# Patient Record
Sex: MALE | Race: WHITE | NOT HISPANIC OR LATINO | Employment: FULL TIME | URBAN - METROPOLITAN AREA
[De-identification: names, ages, dates, MRNs, and addresses within clinical notes are randomized per-mention and may not be internally consistent; named-entity substitution may affect disease eponyms.]

---

## 2019-05-07 ENCOUNTER — OFFICE VISIT - HEALTHEAST (OUTPATIENT)
Dept: FAMILY MEDICINE | Facility: CLINIC | Age: 29
End: 2019-05-07

## 2019-05-07 ENCOUNTER — COMMUNICATION - HEALTHEAST (OUTPATIENT)
Dept: TELEHEALTH | Facility: CLINIC | Age: 29
End: 2019-05-07

## 2019-05-07 DIAGNOSIS — R03.0 ELEVATED BLOOD PRESSURE READING WITHOUT DIAGNOSIS OF HYPERTENSION: ICD-10-CM

## 2019-05-07 DIAGNOSIS — F32.A DEPRESSION, UNSPECIFIED DEPRESSION TYPE: ICD-10-CM

## 2019-05-07 DIAGNOSIS — Z91.09 ENVIRONMENTAL ALLERGIES: ICD-10-CM

## 2019-05-07 DIAGNOSIS — R10.11 RUQ ABDOMINAL PAIN: ICD-10-CM

## 2019-05-07 DIAGNOSIS — R19.4 BOWEL HABIT CHANGES: ICD-10-CM

## 2019-05-07 LAB
ALBUMIN SERPL-MCNC: 4.9 G/DL (ref 3.5–5)
ALP SERPL-CCNC: 55 U/L (ref 45–120)
ALT SERPL W P-5'-P-CCNC: 24 U/L (ref 0–45)
AMYLASE SERPL-CCNC: 57 U/L (ref 5–120)
ANION GAP SERPL CALCULATED.3IONS-SCNC: 13 MMOL/L (ref 5–18)
AST SERPL W P-5'-P-CCNC: 20 U/L (ref 0–40)
BASOPHILS # BLD AUTO: 0 THOU/UL (ref 0–0.2)
BASOPHILS NFR BLD AUTO: 1 % (ref 0–2)
BILIRUB SERPL-MCNC: 0.7 MG/DL (ref 0–1)
BUN SERPL-MCNC: 11 MG/DL (ref 8–22)
CALCIUM SERPL-MCNC: 10.7 MG/DL (ref 8.5–10.5)
CHLORIDE BLD-SCNC: 103 MMOL/L (ref 98–107)
CO2 SERPL-SCNC: 25 MMOL/L (ref 22–31)
CREAT SERPL-MCNC: 0.91 MG/DL (ref 0.7–1.3)
EOSINOPHIL # BLD AUTO: 0.1 THOU/UL (ref 0–0.4)
EOSINOPHIL NFR BLD AUTO: 3 % (ref 0–6)
ERYTHROCYTE [DISTWIDTH] IN BLOOD BY AUTOMATED COUNT: 10.9 % (ref 11–14.5)
GFR SERPL CREATININE-BSD FRML MDRD: >60 ML/MIN/1.73M2
GLUCOSE BLD-MCNC: 94 MG/DL (ref 70–125)
HCT VFR BLD AUTO: 49 % (ref 40–54)
HGB BLD-MCNC: 16.5 G/DL (ref 14–18)
LYMPHOCYTES # BLD AUTO: 1.6 THOU/UL (ref 0.8–4.4)
LYMPHOCYTES NFR BLD AUTO: 45 % (ref 20–40)
MCH RBC QN AUTO: 31 PG (ref 27–34)
MCHC RBC AUTO-ENTMCNC: 33.7 G/DL (ref 32–36)
MCV RBC AUTO: 92 FL (ref 80–100)
MONOCYTES # BLD AUTO: 0.3 THOU/UL (ref 0–0.9)
MONOCYTES NFR BLD AUTO: 9 % (ref 2–10)
NEUTROPHILS # BLD AUTO: 1.5 THOU/UL (ref 2–7.7)
NEUTROPHILS NFR BLD AUTO: 43 % (ref 50–70)
PLATELET # BLD AUTO: 279 THOU/UL (ref 140–440)
PMV BLD AUTO: 7.6 FL (ref 7–10)
POTASSIUM BLD-SCNC: 4.3 MMOL/L (ref 3.5–5)
PROT SERPL-MCNC: 7.8 G/DL (ref 6–8)
RBC # BLD AUTO: 5.33 MILL/UL (ref 4.4–6.2)
SODIUM SERPL-SCNC: 141 MMOL/L (ref 136–145)
TSH SERPL DL<=0.005 MIU/L-ACNC: 1.4 UIU/ML (ref 0.3–5)
WBC: 3.5 THOU/UL (ref 4–11)

## 2019-05-07 ASSESSMENT — MIFFLIN-ST. JEOR: SCORE: 1817.28

## 2019-05-08 ENCOUNTER — COMMUNICATION - HEALTHEAST (OUTPATIENT)
Dept: FAMILY MEDICINE | Facility: CLINIC | Age: 29
End: 2019-05-08

## 2019-05-08 ENCOUNTER — HOSPITAL ENCOUNTER (OUTPATIENT)
Dept: ULTRASOUND IMAGING | Facility: HOSPITAL | Age: 29
Discharge: HOME | End: 2019-05-08
Attending: FAMILY MEDICINE

## 2019-05-08 DIAGNOSIS — R10.11 RUQ ABDOMINAL PAIN: ICD-10-CM

## 2019-10-07 ENCOUNTER — OFFICE VISIT (OUTPATIENT)
Dept: ORTHOPEDICS | Facility: CLINIC | Age: 29
End: 2019-10-07
Payer: COMMERCIAL

## 2019-10-07 ENCOUNTER — ANCILLARY PROCEDURE (OUTPATIENT)
Dept: GENERAL RADIOLOGY | Facility: CLINIC | Age: 29
End: 2019-10-07
Attending: FAMILY MEDICINE
Payer: COMMERCIAL

## 2019-10-07 VITALS — HEIGHT: 72 IN

## 2019-10-07 DIAGNOSIS — M25.561 ACUTE PAIN OF RIGHT KNEE: ICD-10-CM

## 2019-10-07 DIAGNOSIS — M25.561 ACUTE PAIN OF RIGHT KNEE: Primary | ICD-10-CM

## 2019-10-07 RX ORDER — LORATADINE 10 MG/1
10 TABLET ORAL
COMMUNITY
Start: 2012-05-16

## 2019-10-07 RX ORDER — GUAIFENESIN AND DEXTROMETHORPHAN HYDROBROMIDE 600; 30 MG/1; MG/1
1 TABLET, EXTENDED RELEASE ORAL EVERY 12 HOURS
COMMUNITY

## 2019-10-07 RX ORDER — OMEGA-3 FATTY ACIDS/FISH OIL 300-1000MG
400 CAPSULE ORAL EVERY 4 HOURS PRN
COMMUNITY

## 2019-10-07 NOTE — PROGRESS NOTES
SPORTS & ORTHOPEDIC WALK-IN VISIT 10/7/2019    Primary Care Physician:      Here today for medial right knee pain.  While standing up from a bench in the gym on 10/1/2019 he experienced sharp pain and a pop in the medial anterior knee.  Since that time has felt somewhat unstable and is very apprehensive about walking on it.  Has not noted any swelling.  Has not had any injury previously.  Has been using a brace which helps some.  Has been using crutches preventatively to avoid discomfort and instability.  No locking or catching    Reason for visit:     What part of your body is injured / painful?  right knee    What caused the injury /pain? Stood up    How long ago did your injury occur or pain begin? a week ago    What are your most bothersome symptoms? Pain and Swelling    How would you characterize your symptom?  stabbing and sharp    What makes your symptoms better? Rest, Ice, Ibuprofen and Wrap or brace    What makes your symptoms worse? Other: twisting, pivoting     Have you been previously seen for this problem? No    Medical History:    Any recent changes to your medical history? No    Any new medication prescribed since last visit? No    Have you had surgery on this body part before? 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 No    Do you have any vision problems? No    Do you have any chest pain or edema? No    Do you have any shortness of breath or wheezing?  No    Do you have stomach problems? Yes     Do you have any numbness or focal weakness? Yes, some coldness in right knee after injury    Do you have diabetes? No    Do you have problems with bleeding or clotting? No but low platelet count     Do you have an rashes or other skin lesions? No         Past Medical History, Current Medications, and Allergies are reviewed in the electronic  "medical record as appropriate.       EXAM:Ht 1.822 m (5' 11.75\")     Patient is alert, No acute distress, pleasant and conversational.    Gait: nonantalgic. Normal heel toe gait.    right knee:   Skin intact. No erythema or ecchymosis.  No effusion or soft tissue swelling.    AROM: Zero to approximately 135  with some discomfort at terminal flexion.    Palpation:   Very mild ttp over anterior medial knee  No medial or lateral facet joint tenderness.  No posterior medial or posterior lateral joint line tenderness     Special Tests:  Negative bounce test and negative Nenita's.  No ligamentous laxity or pain with valgus or varus stress.  Negative Lachman's, Anterior Drawer and Posterior Drawer     Full Isometric quad strength, extensor mechanism in place     Neurovascularly intact in the lower extremity    Hip and Ankle with full AROM and nontender      Imaging: xrays of right knee performed and reviewed independently demonstrating no acute fx or dislocation. No significant djd. See EMR for formal radiology report.     Assessment: Patient is a 29 year old male with medial right knee pain concerning for mild meniscal injury.    Recommendations:   Reviewed imaging and assessment the patient detail  Recommended continued use of brace and use crutches as needed for comfort.  Recommended discontinuing crutches and brace when possible.  Okay to continue NSAIDs PRN for pain.  Given home exercises.  He will call if he would like formal referral to physical therapy  He will follow-up in 2 weeks if he is not expensing improvement in symptoms.    Glen Strong MD                "

## 2021-05-28 NOTE — PROGRESS NOTES
Assessment / Impression     1. RUQ abdominal pain  HM1(CBC and Differential)    Comprehensive Metabolic Panel    Amylase    Thyroid [place]    US Abdomen Limited    HM1 (CBC with Diff)   2. Bowel habit changes     3. Environmental allergies     4. Elevated blood pressure reading without diagnosis of hypertension     5. Depression, unspecified depression type             Plan:     It is possible that symptoms may be related to cholelithiasis.  For now, recommend checking right upper quadrant ultrasound as well as above initial labs.  We will follow-up once we have these results for further plan.  In the meantime, recommend continuing to abstain from alcohol, limit fatty food intake.  If all testing is normal, consider referral to gastroenterology if symptoms are persisting.    Environmental allergies: Well-controlled at this time, continue loratadine use.    Elevated blood pressure reading: Borderline blood pressure reading today.  We discussed lifestyle changes including weight loss, decreasing sodium intake, increasing physical activity.  We will continue to monitor closely at this point.    Depression: I did review patient's PHQ 9 results with him, suggested additional treatment, whether that be therapy or medication or both.  Patient does not feel ready to take additional action at this time, would prefer not to start medication.  Would recommend patient follow-up with me in 3 months to recheck, sooner if needed.    Subjective:      HPI: Ruel MAEVE Kamara is a 28 y.o. male, new to [facility], who presents for abdominal pain.  He has not seen a PCP in 7 years.  Symptoms started about 2 years and 8 months ago.  Feels some pressure that comes and goes.  Sensation of pressure is the same, though now occurring more frequently, describes it as a stabbing sensation in RUQ that can radiate into right lower abdomen.  Over time, has felt that symptoms can come on after large meal or fatty meals. 3 days ago, felt he noted  some more increased fullness/pressure in RUQ region.  Has then had felt malaise and increased nausea.  Also noted some burning sensation. At its worst, pain is 4/10.  At first, when symptoms started about 2.5 years ago, thought it was due to alcohol use, was previously drinking 4-8 drinks in a setting 3 times a week, though has no had alcohol for last 2 years 8 months.  Symptoms continued to be the same, then over time, more frequent as noted above, and has had increasingly softer stools, with stools ranging in color from orange to light tan.  Symptoms of bowel changes about 2 years ago.  Has had more frequent bowel movements over time, now about 1-2 BMs per day.  No blood in stool.  No black tarry stool.  No fevers, chills, sweats.  No unexplained weight changes.  No vomiting, has had occasional nausea.      He states he has had health screenings at work, has had some borderline elevated blood pressure readings.  No headache, chest pain, vision changes.    Sleeping about 4-6 hours of sleep at night most nights the last few years.  Will stay up late by choice, reading, playing video games, hanging out with friends.  Poor sleep hygiene habits.      Patient is also had a history of depression, had previously been on sertraline, though he self stopped due to adverse sexual side effects.  He had also previously seen a therapist.  Denies any suicidal or homicidal ideation.      Father his had cholecystectomy.    Family history of alcoholism.        Medical History:     Patient Active Problem List   Diagnosis     Environmental allergies       Past Medical History:   Diagnosis Date     Depression     treated [id number], on sertraline, self stopped due to sexual side effects       Past Surgical History:   Procedure Laterality Date     TOOTH EXTRACTION       TYMPANOSTOMY TUBE PLACEMENT Bilateral 1990       Current Medications:     Current Outpatient Medications   Medication Sig     loratadine (CLARITIN) 10 mg tablet Take 10  "mg by mouth.     multivitamin therapeutic tablet Take 1 tablet by mouth daily.       Family History:     Family History   Problem Relation Age of Onset     Hypertension Father      Asthma Father      Leukemia Maternal Grandfather      Parkinsonism Maternal Grandfather      Heart disease Paternal Grandmother      Leukemia Maternal Uncle      Cancer Maternal Aunt         ?cervical     Lung cancer Cousin         heavy smoker       Review of Systems  All other systems reviewed and are negative.         Social History:     Social History     Tobacco Use   Smoking Status Never Smoker   Smokeless Tobacco Never Used     Social History     Social History Narrative    Currently living with parents.  Works as a          Objective:     /88 (Patient Site: Left Arm, Patient Position: Sitting, Cuff Size: Adult Regular)   Pulse 90   Temp 98.8  F (37.1  C) (Oral)   Resp 14   Ht 5' 11.75\" (1.822 m)   Wt 184 lb 12.8 oz (83.8 kg)   BMI 25.24 kg/m    Physical Examination: General appearance - alert, well appearing, and in no distress  Eyes: pupils equal and reactive, extraocular eye movements intact  Ears: normal external ears, clear canals,  Left TM appears normal, with no redness or bulging noted.  Right TM appears normal, with no redness or bulging noted.  Mouth: mucous membranes moist, pharynx normal without lesions  Neck: supple, no significant adenopathy or thyromegaly  Lungs: clear to auscultation, no wheezes, rales or rhonchi, symmetric air entry  Heart: normal rate, regular rhythm, normal S1, S2, no murmurs.  Abdomen: soft, nontender, nondistended, no masses or organomegaly.  No rebound or guarding.  Negative Lim's sign.  Neurological: alert, oriented, normal speech, no focal findings or movement disorder noted.    Extremities: No edema, no clubbing or cyanosis  Psychiatric: Normal affect. Does not appear anxious or depressed.    No results found for this or any previous visit (from the past " 168 hour(s)).      Michelle Benavides MD  5/7/2019  11:11 AM

## 2021-06-03 VITALS — WEIGHT: 184.8 LBS | BODY MASS INDEX: 25.03 KG/M2 | HEIGHT: 72 IN

## 2021-06-16 PROBLEM — Z91.09 ENVIRONMENTAL ALLERGIES: Status: ACTIVE | Noted: 2019-05-07

## 2021-06-19 NOTE — LETTER
Letter by Michelle Benavides MD at      Author: Michelle Benavides MD Service: -- Author Type: --    Filed:  Encounter Date: 5/8/2019 Status: (Other)         Ruel Kamara  [address] [address]             May 8, 2019         Dear Mr. Kamara,    Below are the results from your recent visit:    Resulted Orders   US Abdomen Limited    Narrative    EXAM: US ABDOMEN LIMITED  LOCATION: [facility]  DATE/TIME: 5/8/2019 6:36 AM    INDICATION: RUQ pain r/o gallstones  COMPARISON: None.    FINDINGS:  GALLBLADDER: Nondistended with a normal wall thickness. No pericholecystic fluid or reproducible sonographic Lim's sign. There are 2 punctate echogenic nonmobile gallbladder polyps, the largest which measures 3 mm.   BILE DUCTS: No bile duct dilation. Common duct measures 2 mm.  LIVER: Normal where seen.  RIGHT KIDNEY: No hydronephrosis.  PANCREAS: Not imaged in detail on this limited study. No incidental abnormalities.    No ascites in the right upper quadrant.      Impression    CONCLUSION:  1.  Two punctate gallbladder polyps the larger of which measures 3 mm. Typically at this size no further surveillance imaging is recommended.  2.  Remainder negative as visualized as detailed above.       Hi Ruel, here are your abdominal ultrasound results.  There were 2 small (largest is 3mm) polyps seen in the gallbladder, though these are unlikely related to your symptoms.  No gallstones or other abnormal findings seen on this exam.  Please let me know if you are having more symptoms.      Please call with questions or contact us using [employer].    Sincerely,        Electronically signed by Michelle Benavides MD

## 2021-06-19 NOTE — LETTER
Letter by Michelle Benavides MD at      Author: Michelle Benavides MD Service: -- Author Type: --    Filed:  Encounter Date: 5/8/2019 Status: (Other)         Ruel Kamara  [address] [facility] [address]             May 8, 2019         Dear Mr. Kamara,    Below are the results from your recent visit:    Resulted Orders   Comprehensive Metabolic Panel   Result Value Ref Range    Sodium 141 136 - 145 mmol/L    Potassium 4.3 3.5 - 5.0 mmol/L    Chloride 103 98 - 107 mmol/L    CO2 25 22 - 31 mmol/L    Anion Gap, Calculation 13 5 - 18 mmol/L    Glucose 94 70 - 125 mg/dL    BUN 11 8 - 22 mg/dL    Creatinine 0.91 0.70 - 1.30 mg/dL    GFR MDRD Af Amer >60 >60 mL/min/1.73m2    GFR MDRD Non Af Amer >60 >60 mL/min/1.73m2    Bilirubin, Total 0.7 0.0 - 1.0 mg/dL    Calcium 10.7 (H) 8.5 - 10.5 mg/dL    Protein, Total 7.8 6.0 - 8.0 g/dL    Albumin 4.9 3.5 - 5.0 g/dL    Alkaline Phosphatase 55 45 - 120 U/L    AST 20 0 - 40 U/L    ALT 24 0 - 45 U/L    Narrative    Fasting Glucose reference range is 70-99 mg/dL per  American Diabetes Association (ADA) guidelines.   Amylase   Result Value Ref Range    Amylase 57 5 - 120 U/L   Thyroid [place]   Result Value Ref Range    TSH 1.40 0.30 - 5.00 uIU/mL   HM1 (CBC with Diff)   Result Value Ref Range    WBC 3.5 (L) 4.0 - 11.0 thou/uL    RBC 5.33 4.40 - 6.20 mill/uL    Hemoglobin 16.5 14.0 - 18.0 g/dL    Hematocrit 49.0 40.0 - 54.0 %    MCV 92 80 - 100 fL    MCH 31.0 27.0 - 34.0 pg    MCHC 33.7 32.0 - 36.0 g/dL    RDW 10.9 (L) 11.0 - 14.5 %    Platelets 279 140 - 440 thou/uL    MPV 7.6 7.0 - 10.0 fL    Neutrophils % 43 (L) 50 - 70 %    Lymphocytes % 45 (H) 20 - 40 %    Monocytes % 9 2 - 10 %    Eosinophils % 3 0 - 6 %    Basophils % 1 0 - 2 %    Neutrophils Absolute 1.5 (L) 2.0 - 7.7 thou/uL    Lymphocytes Absolute 1.6 0.8 - 4.4 thou/uL    Monocytes Absolute 0.3 0.0 - 0.9 thou/uL    Eosinophils Absolute 0.1 0.0 - 0.4 thou/uL    Basophils Absolute 0.0 0.0 - 0.2 thou/uL       Your labs  are all ok.  White count is only slightly decreased, and we can recheck lab in 1-3 months.  Please follow-up at that time for office visit, especially if symptoms are ongoing.      Please call with questions or contact us using [employer].    Sincerely,        Electronically signed by Michelle Benavides MD

## 2021-06-20 ENCOUNTER — HEALTH MAINTENANCE LETTER (OUTPATIENT)
Age: 31
End: 2021-06-20

## 2021-10-10 ENCOUNTER — HEALTH MAINTENANCE LETTER (OUTPATIENT)
Age: 31
End: 2021-10-10

## 2022-07-16 ENCOUNTER — HEALTH MAINTENANCE LETTER (OUTPATIENT)
Age: 32
End: 2022-07-16

## 2022-09-18 ENCOUNTER — HEALTH MAINTENANCE LETTER (OUTPATIENT)
Age: 32
End: 2022-09-18

## 2022-10-13 ENCOUNTER — OFFICE VISIT (OUTPATIENT)
Dept: ORTHOPEDICS | Facility: CLINIC | Age: 32
End: 2022-10-13
Payer: COMMERCIAL

## 2022-10-13 ENCOUNTER — ANCILLARY PROCEDURE (OUTPATIENT)
Dept: GENERAL RADIOLOGY | Facility: CLINIC | Age: 32
End: 2022-10-13
Attending: PREVENTIVE MEDICINE
Payer: COMMERCIAL

## 2022-10-13 DIAGNOSIS — M25.579 PAIN IN JOINT, ANKLE AND FOOT: Primary | ICD-10-CM

## 2022-10-13 DIAGNOSIS — S90.122A CONTUSION OF LEFT LESSER TOE(S) W/O DAMAGE TO NAIL, INIT: Primary | ICD-10-CM

## 2022-10-13 DIAGNOSIS — M25.579 PAIN IN JOINT, ANKLE AND FOOT: ICD-10-CM

## 2022-10-13 PROCEDURE — 99204 OFFICE O/P NEW MOD 45 MIN: CPT | Performed by: PREVENTIVE MEDICINE

## 2022-10-13 PROCEDURE — 73630 X-RAY EXAM OF FOOT: CPT | Mod: LT | Performed by: RADIOLOGY

## 2022-10-13 RX ORDER — MULTIVITAMIN,THERAPEUTIC
1 TABLET ORAL DAILY
COMMUNITY

## 2022-10-13 NOTE — LETTER
10/13/2022      RE: Ruel Kamara  [address] [facility] [address]     Dear Colleague,    Thank you for referring your patient, Ruel Kamara, to the [facility] [place]. Please see a copy of my visit note below.    HISTORY OF PRESENT ILLNESS  Mr. Kamara is a pleasant 32 year old year old male who presents to clinic today for left little toe evaluation.      Date of injury: 3 weeks prior  Duration of symptoms: 3 weeks  Mechanism of Injury: stubbed little toe on boxes that were sitting in his living room.        Ruel explains that he feels better today    Location: left toe    Quality:  achy pain    Severity: 5/10 at worst    Duration: see above  Timing: occurs intermittently  Context: occurs while exercising and lifting and using the joint  Modifying factors:  resting and non-use makes it better, movement and use makes it worse  Associated signs & symptoms: some swelling  MEDICAL HISTORY  Patient Active Problem List   Diagnosis     Environmental allergies       Current Outpatient Medications   Medication Sig Dispense Refill     dextromethorphan-guaiFENesin (MUCINEX DM)  MG 12 hr tablet Take 1 tablet by mouth every 12 hours       ibuprofen (ADVIL/MOTRIN) 200 MG capsule Take 400 mg by mouth every 4 hours as needed for fever       loratadine (CLARITIN) 10 MG tablet Take 10 mg by mouth         Allergies   Allergen Reactions     Seasonal Allergies        Family History   Problem Relation Age of Onset     Hypertension Father      Asthma Father      Leukemia Maternal Grandfather      Parkinsonism Maternal Grandfather      Heart Disease Paternal Grandmother      Leukemia Maternal Uncle      Cancer Maternal Aunt         ?cervical     Lung Cancer Cousin         heavy smoker     Social History     Socioeconomic History     Marital status: Single   Tobacco Use     Smoking status: Never     Smokeless tobacco: Never       Additional medical/Social/Surgical histories reviewed  in EPIC and updated as appropriate.     REVIEW OF SYSTEMS (10/13/2022)  10 point ROS of systems including Constitutional, Eyes, Respiratory, Cardiovascular, Gastroenterology, Genitourinary, Integumentary, Musculoskeletal, Psychiatric, Allergic/Immunologic were all negative except for pertinent positives noted in my HPI.     PHYSICAL EXAM  VSS  Vital Signs: There were no vitals taken for this visit. Patient declined being weighed. There is no height or weight on file to calculate BMI.    General  - normal appearance, in no obvious distress  HEENT  - conjunctivae not injected, moist mucous membranes, normocephalic/atraumatic head, ears normal appearance, no lesions, mouth normal appearance, no scars, normal dentition and teeth present  CV  - normal pulses at posterior tib and dorsalis pedis  Pulm  - normal respiratory pattern, non-labored  Musculoskeletal - foot  - stance: normal gait without limp, normal stance without excessive pronation, normal heel inversion with standing heel raise, no obvious leg length discrepancy, normal heel and toe walk  - inspection: no swelling or effusion,  normal bone and joint alignment, no obvious deformity  - palpation: no bony or soft tissue tenderness  - ROM: normal active and passive ROM of great and lesser toes, no pain with MT translation  - strength: 5/5 in all planes  Neuro  - no sensory or motor deficit, grossly normal coordination, normal muscle tone  Skin  - no ecchymosis, erythema, warmth, or induration, no obvious rash  Psych  - interactive, appropriate, normal mood and affect  ASSESSMENT & PLAN  31 yo male with left foot pain due to 5th digit contusion    I independently reviewed the following imaging studies:  xrays foot: shows no fractures    Patient has been doing home exercise physical therapy program for this problem    Given toe exercises  Appropriate PPE was utilized for prevention of spread of Covid-19.  Josiah Luna MD, CAQSM        Again, thank you for  allowing me to participate in the care of your patient.      Sincerely,    Josiah Luna MD

## 2022-10-13 NOTE — PROGRESS NOTES
HISTORY OF PRESENT ILLNESS  Mr. Kamara is a pleasant 32 year old year old male who presents to clinic today for left little toe evaluation.      Date of injury: 3 weeks prior  Duration of symptoms: 3 weeks  Mechanism of Injury: stubbed little toe on boxes that were sitting in his living room.        Ruel explains that he feels better today    Location: left toe    Quality:  achy pain    Severity: 5/10 at worst    Duration: see above  Timing: occurs intermittently  Context: occurs while exercising and lifting and using the joint  Modifying factors:  resting and non-use makes it better, movement and use makes it worse  Associated signs & symptoms: some swelling  MEDICAL HISTORY  Patient Active Problem List   Diagnosis     Environmental allergies       Current Outpatient Medications   Medication Sig Dispense Refill     dextromethorphan-guaiFENesin (MUCINEX DM)  MG 12 hr tablet Take 1 tablet by mouth every 12 hours       ibuprofen (ADVIL/MOTRIN) 200 MG capsule Take 400 mg by mouth every 4 hours as needed for fever       loratadine (CLARITIN) 10 MG tablet Take 10 mg by mouth         Allergies   Allergen Reactions     Seasonal Allergies        Family History   Problem Relation Age of Onset     Hypertension Father      Asthma Father      Leukemia Maternal Grandfather      Parkinsonism Maternal Grandfather      Heart Disease Paternal Grandmother      Leukemia Maternal Uncle      Cancer Maternal Aunt         ?cervical     Lung Cancer Cousin         heavy smoker     Social History     Socioeconomic History     Marital status: Single   Tobacco Use     Smoking status: Never     Smokeless tobacco: Never       Additional medical/Social/Surgical histories reviewed in [facility] and updated as appropriate.     REVIEW OF SYSTEMS (10/13/2022)  10 point ROS of systems including Constitutional, Eyes, Respiratory, Cardiovascular, Gastroenterology, Genitourinary, Integumentary, Musculoskeletal, Psychiatric, Allergic/Immunologic were  all negative except for pertinent positives noted in my HPI.     PHYSICAL EXAM  VSS  Vital Signs: There were no vitals taken for this visit. Patient declined being weighed. There is no height or weight on file to calculate BMI.    General  - normal appearance, in no obvious distress  HEENT  - conjunctivae not injected, moist mucous membranes, normocephalic/atraumatic head, ears normal appearance, no lesions, mouth normal appearance, no scars, normal dentition and teeth present  CV  - normal pulses at posterior tib and dorsalis pedis  Pulm  - normal respiratory pattern, non-labored  Musculoskeletal - foot  - stance: normal gait without limp, normal stance without excessive pronation, normal heel inversion with standing heel raise, no obvious leg length discrepancy, normal heel and toe walk  - inspection: no swelling or effusion,  normal bone and joint alignment, no obvious deformity  - palpation: no bony or soft tissue tenderness  - ROM: normal active and passive ROM of great and lesser toes, no pain with MT translation  - strength: 5/5 in all planes  Neuro  - no sensory or motor deficit, grossly normal coordination, normal muscle tone  Skin  - no ecchymosis, erythema, warmth, or induration, no obvious rash  Psych  - interactive, appropriate, normal mood and affect  ASSESSMENT & PLAN  31 yo male with left foot pain due to 5th digit contusion    I independently reviewed the following imaging studies:  xrays foot: shows no fractures    Patient has been doing home exercise physical therapy program for this problem    Given toe exercises  Appropriate PPE was utilized for prevention of spread of Covid-19.  Josiah Luna MD, CAM

## 2022-10-13 NOTE — LETTER
Date:October 24, 2022      Patient was self referred, no letter generated. Do not send.        [facility] Health Information

## 2023-07-30 ENCOUNTER — HEALTH MAINTENANCE LETTER (OUTPATIENT)
Age: 33
End: 2023-07-30

## 2024-09-22 ENCOUNTER — HEALTH MAINTENANCE LETTER (OUTPATIENT)
Age: 34
End: 2024-09-22